# Patient Record
Sex: MALE | URBAN - METROPOLITAN AREA
[De-identification: names, ages, dates, MRNs, and addresses within clinical notes are randomized per-mention and may not be internally consistent; named-entity substitution may affect disease eponyms.]

---

## 2022-07-11 ENCOUNTER — TELEPHONE (OUTPATIENT)
Dept: TRANSPLANT | Facility: CLINIC | Age: 57
End: 2022-07-11
Payer: MEDICARE

## 2022-07-11 NOTE — TELEPHONE ENCOUNTER
----- Message from Erick Casillas sent at 7/11/2022  2:54 PM CDT -----  Pt is being referred to lung transplant. I have scanned the referral into media mgr within Epic. Please review and contact for scheduling,thanks

## 2022-07-11 NOTE — LETTER
July 12, 2022    Vick Madison  [address] [facility] [address]  [place] [address]  Phone: [phone]  Fax: [phone]          Dear Vick Madison:    Patient: Davis Lopez   MR Number: [id number]   YOB: 1965     Thank you for the referral of Davis Lopez to our lung transplant program. Your patients demographic and clinical information have been submitted for transplant provider review and financial clearance. Once the provider and insurance company has approved the consult for your patient, he will be contacted by our office re: the date for an appointment. We will update you once this initial appointment has been scheduled. You will receive an after-visit summary following the completion of your patients appointment in our clinic.    Thank you again for your trust in our program. If there is anything we can do for you or your staff, please feel free to contact us at [phone].    Sincerely,       Isha Champagne D.O.  Medical Director, Lung Transplant  Pulmonary & Critical Care Medicine    [facility]  [address] [place] [address]  [phone]

## 2022-07-12 ENCOUNTER — TELEPHONE (OUTPATIENT)
Dept: TRANSPLANT | Facility: CLINIC | Age: 57
End: 2022-07-12
Payer: MEDICARE

## 2022-07-12 DIAGNOSIS — J44.9 CHRONIC OBSTRUCTIVE PULMONARY DISEASE, UNSPECIFIED COPD TYPE: Primary | ICD-10-CM

## 2022-07-12 DIAGNOSIS — Z76.82 LUNG TRANSPLANT CANDIDATE: ICD-10-CM

## 2022-07-12 NOTE — TELEPHONE ENCOUNTER
"Contacted patient about lung transplant referral, initial scheduling. Patient is interested and requests appointment in September. Offered September the 6th. Patient requested this date. Patient is aware we will confirm appointment date and time, he will need to obtain 6MWT, echo, and CT with consult. Patient verbalized understanding, he is aware will need to wear a mask at all times, bring oxygen for travel, and states his wife will accompany him. All questions answered at this time. Request to . Notes covid vaccine 4/1/21 and 5/5/21 patient is aware to bring covid card to initial appt. Patient is advised he will need insurance secondary / supplement to proceed with transplant.    ----- Message from Isha Champagne DO sent at 7/12/2022 12:29 PM CDT -----  Regarding: RE: Lung Transplant Referral  Appropriate for LUT referral.  Needs PFTs, 6MWT, and TTE at time of visit.  Needs to bring a disc with his CT imaging to his appointment.  Thanks    ----- Message -----  From: Mylene White  Sent: 7/12/2022  12:21 PM CDT  To: Isha Champagne DO  Subject: Lung Transplant Referral                         Lung Transplant Referral Note     Referral from: Dr. Vick Madison    Lung diagnosis: COPD, dx.~ 2010; >25 year history as a ; positive PPD s/p isoniazid tx. (Both parents had TB, patient with previous +PPD)    Age: 56 y.o. male    Height/Weight/BMI:  5' 11" / 177 lb  / 24.7 kg/m²     Smoking history: Social History     Tobacco Use       Smoking status: Former Smoker noted 2/22        Packs/day: unknown         Years: >30         Pack years: 60 (per CT lung screen)        Types: Cigarettes        Quit date: unknown         Years since quitting: unknown       Smokeless tobacco: unknown     Other Drug use: denies    PFT date: 2/18/20   FVC 1.72 (35%)  FEV1 0.71 (19%)  DLCO (not provided) TLC (not provided)     6 MWT: not provided  Does have portable Oxygen, reported O2 sat on 6/29/22: RA 88%, on " 3L NC sat 91% (not specified if at rest or with activity).       CXR date: 10/20/21, 2/23/22   Impression: Chronic pulmonary fibrosis and atelectasis - 10/20/21     Chest CT date: 2/2/22 (CT Lung Screening), 6/29/22   Impression:   There are multifocal pole identified in the right lower lobe. There is reticular biapical pleural parenchymal scarring. There is diffuse fissural thickening at the mid lung  right major fissure. There is linear right midlung scarring...   There is no lung nodule, mass, pleural effusion...   The lungs bilaterally are chronically hyperinflated. There is diffuse centrilobular emphysema.    Echo date: not reported/received         Other pertinent medical history: family, personal history of TB, +PPD; covid vaccine x2 (4/2021, 5/5/21); new hypotension (93/62 at 6/29/22 office visit)     Recommendations?

## 2022-07-25 ENCOUNTER — DOCUMENTATION ONLY (OUTPATIENT)
Dept: TRANSPLANT | Facility: CLINIC | Age: 57
End: 2022-07-25
Payer: MEDICARE

## 2022-08-08 ENCOUNTER — TELEPHONE (OUTPATIENT)
Dept: TRANSPLANT | Facility: CLINIC | Age: 57
End: 2022-08-08
Payer: MEDICARE

## 2022-08-08 NOTE — TELEPHONE ENCOUNTER
Spoke with patient, per  he reports his family and he have covid and they are requesting to defer scheduling to October. Patient is aware we will reach out to patient in September for rescheduling.    Patient in deferred status per his choice, notified .

## 2022-10-07 ENCOUNTER — TELEPHONE (OUTPATIENT)
Dept: TRANSPLANT | Facility: CLINIC | Age: 57
End: 2022-10-07
Payer: MEDICARE

## 2022-10-07 NOTE — TELEPHONE ENCOUNTER
Spoke with patient about r/s upcoming appointments, pt wants the nurse to give him a call because he has not got gotten shots done as well as any paperwork. He has transportation issues.

## 2022-10-07 NOTE — LETTER
October 7, 2022    Vick Madison  [address] [facility] [address]  [place] [address]  Phone: [phone]  Fax: [phone]        Dear Vick Gricelda:    Patient: Davis Lopez   MR Number: [id number]   YOB: 1965     Thank you for the referral of Davis Lopez to our lung transplant program. An initial appointment with the transplant team has not been scheduled, due to the patient's choice as a result of caregiver availability and transplantation issues. He is requesting we reach out to him in January, if he has not contacted us to schedule an initial visit.     Thank you again for your trust in our program. If there is anything we can do for you or your staff, please feel free to contact us at [phone].    Sincerely,       Isha Champagne D.O.  Medical Director, Lung Transplant  Pulmonary & Critical Care Medicine    [facility]  [address] [place] [address]  [phone]     CS/fmk

## 2023-05-29 ENCOUNTER — TELEPHONE (OUTPATIENT)
Dept: TRANSPLANT | Facility: CLINIC | Age: 58
End: 2023-05-29
Payer: MEDICARE

## 2023-05-29 NOTE — TELEPHONE ENCOUNTER
Attempted to reach patient by phone, no answer, voicemail full. Letter sent requesting return call to discuss interest in lung transplant referral / scheduling. Cc: Dr. Madison.

## 2023-05-29 NOTE — LETTER
May 29, 2023    [address]  [facility] [address]          Dear Davis Lopez:    Patient: Davis Lopez   MR Number: [id number]   YOB: 1965     We hope this letter finds you well. We have tried to reach you by phone to schedule your initial appointment in the lung transplant department, after deferral per your request since last August. We are trying to reach you based on referral from Dr. Vick Madison. Please contact us at [phone] to schedule your appointment or to let us know if you are interested in pursuing lung transplant at this time. Failure to respond in 30 days will result in closure of your current referral.                                                                               Sincerely,     Isha Champagne D.O.  Medical Director, Lung Transplant  Pulmonary & Critical Care Medicine  [facility] [place]  [address] [place] [address]  [phone] (tel)  [phone] (fax)    CC: Dr. Vick Madison    CS/fmk

## 2023-07-21 ENCOUNTER — DOCUMENTATION ONLY (OUTPATIENT)
Dept: TRANSPLANT | Facility: CLINIC | Age: 58
End: 2023-07-21
Payer: MEDICARE

## 2023-11-28 ENCOUNTER — TELEPHONE (OUTPATIENT)
Dept: TRANSPLANT | Facility: CLINIC | Age: 58
End: 2023-11-28
Payer: MEDICARE

## 2023-11-28 NOTE — LETTER
November 30, 2023    Vick Madison  [address] [facility] [address]  [place] [address]  Phone: [phone]  Fax: [phone]          Dear Vick Madison:    Patient: Davis Lopez   MR Number: [id number]   YOB: 1965     Thank you for the referral of Davis Lopez to our lung transplant program. Your patients demographic and clinical information have been submitted for transplant provider review and financial clearance. Once the provider and insurance company has approved the consult for your patient, he will be contacted by our office re: the date for an appointment. We will update you once this initial appointment has been scheduled. You will receive an after-visit summary following the completion of your patients appointment in our clinic.    Thank you again for your trust in our program. If there is anything we can do for you or your staff, please feel free to contact us at [phone].    Sincerely,       Isha Champagne D.O.  Medical Director, Lung Transplant  Pulmonary & Critical Care Medicine    [facility]  [address] [place] [address]  [phone] tel  [phone] fax

## 2023-11-28 NOTE — LETTER
December 6, 2023    Vick Madison  [address] [facility] [address]  [place] [address]  Phone: [phone]  Fax: [phone]          Dear Vick Gricelda:    Patient: Davis Lopez   MR Number: [id number]   YOB: 1965     Thank you for the referral of Davis Lopez to our lung transplant program.    Upon reviewing the information provided by your office, we find that Davis Lopez is not a potential candidate for lung transplantation at [facility] due to the following:      Patient's insurance is out of network for lung transplant services at this facility (patient would be required to use out of network benefits, that would only be valid for one year).      Once again, we appreciate your referral to our center and we look forward to working with you in the future.     If you have any questions or concerns regarding this decision, then please do not hesitate to contact me at [phone].    Sincerely,       Isha Champagne D.O.  Medical Director, Lung Transplant  Pulmonary & Critical Care Medicine    [facility] [place]  [address] [place] [address]  [phone] tel  [phone] fax

## 2023-11-28 NOTE — TELEPHONE ENCOUNTER
Referral routed to provider for review.  ----- Message from Nina Wright sent at 11/28/2023 11:05 AM CST -----  Regarding: Ext referral - Urgent requst  Good morning,    Current pt is being referred from Dr Vick Madison for Lung txp eval. I have scanned the referral and records in to media mgr. Please contact pt to schedule and let me know if I can help any further.    Thank you,  Nina Wright  Clinic [facility]  Ext [phone]

## 2023-11-30 ENCOUNTER — TELEPHONE (OUTPATIENT)
Dept: TRANSPLANT | Facility: CLINIC | Age: 58
End: 2023-11-30
Payer: MEDICARE

## 2023-11-30 NOTE — TELEPHONE ENCOUNTER
"11/30/23: Received the plan of care regarding the lung transplant referral from Dr. Champagne. Sent a message to  José Henderson requesting financial clearance for the consult visit and testing.    12/1/23: Notified by José Henderson that a SCA with the patient's [employer] plan will be required for transplant services.    12/4/23: Notified the patient's coordinator Mylene White RN about the 's plan to obtain a SCA for lung transplant services at [facility].       ----- Message from Isha Champagne DO sent at 11/30/2023  3:14 PM CST -----  Regarding: RE: LUT referral  Appropriate for LUT.  Needs PFTs, 6MWT, TTE and CT chest    ----- Message -----  From: Mylene White  Sent: 11/30/2023   9:58 AM CST  To: Isha Champagne DO  Subject: LUT referral                                     Lung Transplant Referral Note     Referral from: Dr. Vick Madison     Lung diagnosis: COPD - previously referred July 2022, patient declined scheduling.     Age: 58 y.o. male     Height/Weight/BMI:  5' 11" / 191 lb / 86.63 kg  / 26.6 kg/m²     Smoking history: Social History     Tobacco Use       Smoking status: smoking status not noted if current or former         Packs/day: not stated         Years: 43?         Pack years: ?         Types: cigarettes         Quit date: not noted         Years since quitting: n/a       Smokeless tobacco: not stated     Other Drug use: not stated    PFT date: 2/18/20   FVC 1.72 (35%)  FEV1 0.71 (19%)  DLCO not reported TLC not reported      6 MWT date: not reported   Distance "short of breath walking 100 feet in 2010". De-satted to 98% on 3L at rest in clinic. Does have portable Oxygen.       CXR date: 11/29/23   Impression: "clear"     Chest CT date: 6/29/22   Impression:   "Hyperinflated lungs"   "Parenchymal scarring"    Echo date: not received          Other pertinent medical history: hospitalized 11/5-8/2023 for exacerbation " "with noted pCO2 103. Planned to d/c on Bipap, received CPAP per notes.     Covid vaccine x3 in the past, last 2021.  Alpha 1 antitrypsin level 148 - 2/10/10     Patient declined previous referral scheduling in 2022.  Per previous referral note in 2022:  COPD, dx.~ 2010; >25 year history as a ; positive PPD s/p isoniazid tx. (Both parents had TB, patient with previous +PPD)     Age: 56 y.o. male     Height/Weight/BMI:  5' 11" / 177 lb  / 24.7 kg/m²      Smoking history: Social History     Tobacco Use       Smoking status: Former Smoker noted 2/22        Packs/day: unknown         Years: >30         Pack years: 60 (per CT lung screen)        Types: Cigarettes        Quit date: unknown         Years since quitting: unknown       Smokeless tobacco: unknown      Other Drug use: denies     PFT date: 2/18/20   FVC 1.72 (35%)  FEV1 0.71 (19%)  DLCO (not provided) TLC (not provided)      6 MWT: not provided  Does have portable Oxygen, reported O2 sat on 6/29/22: RA 88%, on 3L NC sat 91% (not specified if at rest or with activity).       CXR date: 10/20/21, 2/23/22   Impression: Chronic pulmonary fibrosis and atelectasis - 10/20/21      Chest CT date: 2/2/22 (CT Lung Screening), 6/29/22   Impression:   There are multifocal pole identified in the right lower lobe. There is reticular biapical pleural parenchymal scarring. There is diffuse fissural thickening at the mid lung  right major fissure. There is linear right midlung scarring...   There is no lung nodule, mass, pleural effusion...   The lungs bilaterally are chronically hyperinflated. There is diffuse centrilobular emphysema.     Echo date: not reported/received         Other pertinent medical history: family, personal history of TB, +PPD treated with INH; covid vaccine x2 (4/2021, 5/5/21); new hypotension (93/62 at 6/29/22 office visit)      Recommendations?         "

## 2024-03-23 NOTE — TELEPHONE ENCOUNTER
Followed up with patient, he reports he is unable to come to [place] due to family and transportation issues. He is requesting to defer until January 2023. Patient is aware he can contact to schedule sooner if he is ready. Letter sent to referring provider.    BMI: BMI (kg/m2): 25.3 (03-20-24 @ 13:55)  HbA1c:   Glucose:   BP: 123/66 (03-23-24 @ 09:55) (102/60 - 123/66)Vital Signs Last 24 Hrs  T(C): 36 (03-23-24 @ 09:55), Max: 36 (03-23-24 @ 09:55)  T(F): 96.8 (03-23-24 @ 09:55), Max: 96.8 (03-23-24 @ 09:55)  HR: 82 (03-23-24 @ 09:55) (82 - 82)  BP: 123/66 (03-23-24 @ 09:55) (123/66 - 123/66)  BP(mean): --  RR: --  SpO2: --      Lipid Panel: